# Patient Record
Sex: FEMALE | URBAN - METROPOLITAN AREA
[De-identification: names, ages, dates, MRNs, and addresses within clinical notes are randomized per-mention and may not be internally consistent; named-entity substitution may affect disease eponyms.]

---

## 2024-10-07 ENCOUNTER — APPOINTMENT (RX ONLY)
Dept: URBAN - METROPOLITAN AREA CLINIC 142 | Facility: CLINIC | Age: 34
Setting detail: DERMATOLOGY
End: 2024-10-07

## 2024-10-07 DIAGNOSIS — L91.8 OTHER HYPERTROPHIC DISORDERS OF THE SKIN: ICD-10-CM

## 2024-10-07 DIAGNOSIS — L82.1 OTHER SEBORRHEIC KERATOSIS: ICD-10-CM

## 2024-10-07 DIAGNOSIS — B35.8 OTHER DERMATOPHYTOSES: ICD-10-CM

## 2024-10-07 PROCEDURE — ? PRESCRIPTION

## 2024-10-07 PROCEDURE — 99203 OFFICE O/P NEW LOW 30 MIN: CPT

## 2024-10-07 PROCEDURE — ? TREATMENT REGIMEN

## 2024-10-07 PROCEDURE — ? INVENTORY

## 2024-10-07 PROCEDURE — ? COUNSELING

## 2024-10-07 PROCEDURE — ? SKIN TAG REMOVAL (COSMETIC)

## 2024-10-07 RX ORDER — KETOCONAZOLE 20 MG/G
CREAM TOPICAL
Qty: 30 | Refills: 0 | Status: ERX | COMMUNITY
Start: 2024-10-07

## 2024-10-07 RX ADMIN — KETOCONAZOLE: 20 CREAM TOPICAL at 00:00

## 2024-10-07 ASSESSMENT — LOCATION SIMPLE DESCRIPTION DERM
LOCATION SIMPLE: LEFT ANTERIOR NECK
LOCATION SIMPLE: RIGHT UPPER ARM
LOCATION SIMPLE: RIGHT ANTERIOR NECK

## 2024-10-07 ASSESSMENT — LOCATION DETAILED DESCRIPTION DERM
LOCATION DETAILED: RIGHT INFERIOR LATERAL NECK
LOCATION DETAILED: LEFT INFERIOR LATERAL NECK
LOCATION DETAILED: RIGHT CLAVICULAR NECK
LOCATION DETAILED: RIGHT ANTERIOR LATERAL PROXIMAL UPPER ARM
LOCATION DETAILED: LEFT SUPERIOR LATERAL NECK

## 2024-10-07 ASSESSMENT — LOCATION ZONE DERM
LOCATION ZONE: ARM
LOCATION ZONE: NECK

## 2024-10-07 NOTE — PROCEDURE: SKIN TAG REMOVAL (COSMETIC)
Consent: Written consent obtained and the risks of skin tag removal was reviewed with the patient including but not limited to bleeding, pigmentary change, infection, pain, and remote possibility of scarring.
Removed With: gradle excision
Price (Use Numbers Only, No Special Characters Or $): 173.00
Detail Level: Detailed
Anesthesia Volume In Cc: 0

## 2024-10-07 NOTE — PROCEDURE: TREATMENT REGIMEN
Initiate Treatment: .\\n- Ketoconazole 2 % topical cream, Apply to affected area on arm twice daily x1 month or until resolved
Detail Level: Zone
Plan: DDX MG vs Tinea corporis. Consider terbinafine if not improved in 4-6 weeks.
Plan: .\\n- Cautery was used to remove 2 sks. (Included in cosmetic fee)